# Patient Record
Sex: MALE | Race: WHITE | NOT HISPANIC OR LATINO | Employment: FULL TIME | URBAN - METROPOLITAN AREA
[De-identification: names, ages, dates, MRNs, and addresses within clinical notes are randomized per-mention and may not be internally consistent; named-entity substitution may affect disease eponyms.]

---

## 2024-01-29 ENCOUNTER — TELEPHONE (OUTPATIENT)
Dept: PSYCHIATRY | Facility: CLINIC | Age: 34
End: 2024-01-29

## 2024-01-29 NOTE — TELEPHONE ENCOUNTER
"Pt is a(n) adult (18+ out of HS) Seeking as eval for Adult Mental Health DA for Programmatic Care - Program Preference? Yes: IOP MH Day [place] .    Appointment scheduled by:  Other  (no cost estimate) Recovery   Patient name:  Harrison Peres    Caller phone #: [phone]    Legal Guardianship Reviewed?  No  Honoring Choices Notified?  No    Brief reason for appt:  Pt is currently in external recovery program, pt primarily struggling with mental health at this time,  wanting to see if this would be a good option for patient or not.      needed?  NO    Contact information verified/updated: Yes    Leonor Koenig    \"We have scheduled your evaluation. In the event that your insurance coverage comes back as out of network, you may receive a call to cancel your appointment and direct you to your insurance company for in-network coverage.\"    Disclaimer regarding insurance read to patient?  Yes     "

## 2024-02-13 NOTE — TELEPHONE ENCOUNTER
Pt, Harrison, insurance terminated 01/01/2024. Called pt to request new ins info. Call went to voicemail and voicemail box is full, unable to leave message for pt to request new ins info.

## 2024-04-11 ENCOUNTER — OFFICE VISIT (OUTPATIENT)
Dept: FAMILY MEDICINE | Facility: CLINIC | Age: 34
End: 2024-04-11
Payer: MEDICAID

## 2024-04-11 VITALS
WEIGHT: 315 LBS | DIASTOLIC BLOOD PRESSURE: 68 MMHG | OXYGEN SATURATION: 96 % | SYSTOLIC BLOOD PRESSURE: 109 MMHG | HEIGHT: 78 IN | BODY MASS INDEX: 36.45 KG/M2 | HEART RATE: 76 BPM | RESPIRATION RATE: 18 BRPM

## 2024-04-11 DIAGNOSIS — Z13.29 SCREENING FOR THYROID DISORDER: ICD-10-CM

## 2024-04-11 DIAGNOSIS — F12.10 CANNABIS ABUSE: ICD-10-CM

## 2024-04-11 DIAGNOSIS — Z13.0 SCREENING FOR BLOOD DISEASE: ICD-10-CM

## 2024-04-11 DIAGNOSIS — F23 BRIEF PSYCHOTIC DISORDER (H): ICD-10-CM

## 2024-04-11 DIAGNOSIS — Z13.1 SCREENING FOR DIABETES MELLITUS: ICD-10-CM

## 2024-04-11 DIAGNOSIS — Z13.220 SCREENING, LIPID: ICD-10-CM

## 2024-04-11 DIAGNOSIS — F43.0 ACUTE STRESS DISORDER: Primary | ICD-10-CM

## 2024-04-11 LAB
ERYTHROCYTE [DISTWIDTH] IN BLOOD BY AUTOMATED COUNT: 11.9 % (ref 10–15)
HBA1C MFR BLD: 5.2 % (ref 0–5.6)
HCT VFR BLD AUTO: 45.3 % (ref 40–53)
HGB BLD-MCNC: 14.7 G/DL (ref 13.3–17.7)
MCH RBC QN AUTO: 29.1 PG (ref 26.5–33)
MCHC RBC AUTO-ENTMCNC: 32.5 G/DL (ref 31.5–36.5)
MCV RBC AUTO: 90 FL (ref 78–100)
PLATELET # BLD AUTO: 282 10E3/UL (ref 150–450)
RBC # BLD AUTO: 5.06 10E6/UL (ref 4.4–5.9)
WBC # BLD AUTO: 9.4 10E3/UL (ref 4–11)

## 2024-04-11 PROCEDURE — 80053 COMPREHEN METABOLIC PANEL: CPT | Performed by: PHYSICIAN ASSISTANT

## 2024-04-11 PROCEDURE — 83036 HEMOGLOBIN GLYCOSYLATED A1C: CPT | Performed by: PHYSICIAN ASSISTANT

## 2024-04-11 PROCEDURE — 84443 ASSAY THYROID STIM HORMONE: CPT | Performed by: PHYSICIAN ASSISTANT

## 2024-04-11 PROCEDURE — 99203 OFFICE O/P NEW LOW 30 MIN: CPT | Performed by: PHYSICIAN ASSISTANT

## 2024-04-11 PROCEDURE — 80061 LIPID PANEL: CPT | Performed by: PHYSICIAN ASSISTANT

## 2024-04-11 PROCEDURE — 36415 COLL VENOUS BLD VENIPUNCTURE: CPT | Performed by: PHYSICIAN ASSISTANT

## 2024-04-11 PROCEDURE — 85027 COMPLETE CBC AUTOMATED: CPT | Performed by: PHYSICIAN ASSISTANT

## 2024-04-11 RX ORDER — CLONIDINE HYDROCHLORIDE 0.2 MG/1
1 TABLET ORAL
COMMUNITY
Start: 2024-03-22 | End: 2024-09-23

## 2024-04-11 RX ORDER — OLANZAPINE 10 MG/1
10 TABLET ORAL EVERY MORNING
COMMUNITY
Start: 2024-03-22 | End: 2024-07-22

## 2024-04-11 RX ORDER — QUETIAPINE 300 MG/1
300 TABLET, FILM COATED, EXTENDED RELEASE ORAL EVERY EVENING
COMMUNITY
Start: 2024-03-22 | End: 2024-07-22

## 2024-04-11 NOTE — LETTER
April 24, 2024      Harrison JUAREZ [place] Darling  [address] [facility] [address]        Dear Mr.Della Hastings,    We are writing to inform you of your test results.    It was a pleasure to see you at your recent visit.              The results of your recent total cholesterol test was within normal limits.      You have normal red blood cell (hemoglobin) levels, no anemia, normal white blood cell count and normal platelet levels.     Your TSH is slightly elevated but the actual T4 thyroid hormone is within normal limits.  Most of the time this doesn't require treatment or adjustments in medication.  Please follow-up if you are experiencing symptoms such as fatigue, low moods, constipation, swelling in extremities and cold intolerance.     Your complete metabolic panel indicates normal kidney function, normal electrolyte levels (sodium, potassium, and calcium levels are normal), and normal liver function (ALT, AST, bilirubin).       Your A1c is within normal limits        The rest of your labs are within acceptable limits :     Resulted Orders   Lipid Profile   Result Value Ref Range    Cholesterol 184 <200 mg/dL    Triglycerides 171 (H) <150 mg/dL    Direct Measure HDL 32 (L) >=40 mg/dL    LDL Cholesterol Calculated 118 (H) <=100 mg/dL    Non HDL Cholesterol 152 (H) <130 mg/dL    Patient Fasting > 8hrs? Unknown     Narrative    Cholesterol  Desirable:  <200 mg/dL    Triglycerides  Normal:  Less than 150 mg/dL  Borderline High:  150-199 mg/dL  High:  200-499 mg/dL  Very High:  Greater than or equal to 500 mg/dL    Direct Measure HDL  Female:  Greater than or equal to 50 mg/dL   Male:  Greater than or equal to 40 mg/dL    LDL Cholesterol  Desirable:  <100mg/dL  Above Desirable:  100-129 mg/dL   Borderline High:  130-159 mg/dL   High:  160-189 mg/dL   Very High:  >= 190 mg/dL    Non HDL Cholesterol  Desirable:  130 mg/dL  Above Desirable:  130-159 mg/dL  Borderline High:  160-189 mg/dL  High:  190-219  mg/dL  Very High:  Greater than or equal to 220 mg/dL   Comprehensive metabolic panel   Result Value Ref Range    Sodium 140 135 - 145 mmol/L      Comment:      Reference intervals for this test were updated on 09/26/2023 to more accurately reflect our healthy population. There may be differences in the flagging of prior results with similar values performed with this method. Interpretation of those prior results can be made in the context of the updated reference intervals.     Potassium 4.5 3.4 - 5.3 mmol/L    Carbon Dioxide (CO2) 25 22 - 29 mmol/L    Anion Gap 12 7 - 15 mmol/L    Urea Nitrogen 13.0 6.0 - 20.0 mg/dL    Creatinine 1.00 0.67 - 1.17 mg/dL    GFR Estimate >90 >60 mL/min/1.73m2    Calcium 9.5 8.6 - 10.0 mg/dL    Chloride 103 98 - 107 mmol/L    Glucose 90 70 - 99 mg/dL    Alkaline Phosphatase 85 40 - 150 U/L      Comment:      Reference intervals for this test were updated on 11/14/2023 to more accurately reflect our healthy population. There may be differences in the flagging of prior results with similar values performed with this method. Interpretation of those prior results can be made in the context of the updated reference intervals.    AST 23 0 - 45 U/L      Comment:      Reference intervals for this test were updated on 6/12/2023 to more accurately reflect our healthy population. There may be differences in the flagging of prior results with similar values performed with this method. Interpretation of those prior results can be made in the context of the updated reference intervals.    ALT 30 0 - 70 U/L      Comment:      Reference intervals for this test were updated on 6/12/2023 to more accurately reflect our healthy population. There may be differences in the flagging of prior results with similar values performed with this method. Interpretation of those prior results can be made in the context of the updated reference intervals.      Protein Total 6.7 6.4 - 8.3 g/dL    Albumin 4.2 3.5 - 5.2  g/dL    Bilirubin Total 0.3 <=1.2 mg/dL   TSH with free T4 reflex   Result Value Ref Range    TSH 1.56 0.30 - 4.20 uIU/mL   CBC with platelets   Result Value Ref Range    WBC Count 9.4 4.0 - 11.0 10e3/uL    RBC Count 5.06 4.40 - 5.90 10e6/uL    Hemoglobin 14.7 13.3 - 17.7 g/dL    Hematocrit 45.3 40.0 - 53.0 %    MCV 90 78 - 100 fL    MCH 29.1 26.5 - 33.0 pg    MCHC 32.5 31.5 - 36.5 g/dL    RDW 11.9 10.0 - 15.0 %    Platelet Count 282 150 - 450 10e3/uL   Hemoglobin A1c   Result Value Ref Range    Hemoglobin A1C 5.2 0.0 - 5.6 %      Comment:      Normal <5.7%   Prediabetes 5.7-6.4%    Diabetes 6.5% or higher     Note: Adopted from ADA consensus guidelines.       If you have any questions or concerns, please call the clinic at the number listed above.       Sincerely,      Ramona Ann Aaseby-Aguilera, PA-C

## 2024-04-11 NOTE — PROGRESS NOTES
"  Assessment & Plan     Acute stress disorder  Pt request for referral to mental   - Adult Mental Health  Referral; Future    Cannabis abuse    - Adult Mental Health  Referral; Future    Brief psychotic disorder (H)    - Adult Mental Health  Referral; Future    Screening for diabetes mellitus    - Comprehensive metabolic panel  - Hemoglobin A1c    Screening, lipid    - Lipid Profile    Screening for thyroid disorder    - TSH with free T4 reflex    Screening for blood disease    - CBC with platelets          BMI  Estimated body mass index is 38.43 kg/m  as calculated from the following:    Height as of this encounter: 2.07 m (6' 9.5\").    Weight as of this encounter: 164.7 kg (363 lb 1.6 oz).   Weight management plan: Discussed healthy diet and exercise guidelines          Gabrielle Terry is a 34 year old, presenting for the following health issues:  Establish Care and Referral (Psychiatric )        4/11/2024     2:09 PM   Additional Questions   Roomed by MARCO ANTONIO Fatima   Accompanied by Self       Is living in a rehab right now. Sees DR. Beny Caro at [facility] and was cleared of a diagnosis he received 4 years ago of delusional disorder.     Today wishing to establish care with a pcp.     Needs a refer Mental health.   History of Present Illness       Reason for visit:  General visit    He eats 2-3 servings of fruits and vegetables daily.He consumes 1 sweetened beverage(s) daily.He exercises with enough effort to increase his heart rate 30 to 60 minutes per day.  He exercises with enough effort to increase his heart rate 5 days per week.   He is taking medications regularly.                 Review of Systems  CONSTITUTIONAL: NEGATIVE for fever, chills, change in weight  INTEGUMENTARY/SKIN: NEGATIVE for worrisome rashes, moles or lesions  EYES: NEGATIVE for vision changes or irritation  ENT/MOUTH: NEGATIVE for ear, mouth and throat problems  RESP: NEGATIVE for significant " "cough or SOB  BREAST: NEGATIVE for masses, tenderness or discharge  CV: NEGATIVE for chest pain, palpitations or peripheral edema  GI: NEGATIVE for nausea, abdominal pain, heartburn, or change in bowel habits  : NEGATIVE for frequency, dysuria, or hematuria  MUSCULOSKELETAL: NEGATIVE for significant arthralgias or myalgia  NEURO: NEGATIVE for weakness, dizziness or paresthesias  ENDOCRINE: NEGATIVE for temperature intolerance, skin/hair changes  HEME: NEGATIVE for bleeding problems  PSYCHIATRIC: NEGATIVE for changes in mood or affect      Objective    /68 (BP Location: Right arm, Patient Position: Sitting, Cuff Size: Adult Large)   Pulse 76   Resp 18   Ht 2.07 m (6' 9.5\")   Wt (!) 164.7 kg (363 lb 1.6 oz)   SpO2 96%   BMI 38.43 kg/m    Body mass index is 38.43 kg/m .  Physical Exam   GENERAL: alert and no distress  EYES: Eyes grossly normal to inspection, PERRL and conjunctivae and sclerae normal  HENT: ear canals and TM's normal, nose and mouth without ulcers or lesions  NECK: no adenopathy, no asymmetry, masses, or scars  RESP: lungs clear to auscultation - no rales, rhonchi or wheezes  CV: regular rate and rhythm, normal S1 S2, no S3 or S4, no murmur, click or rub, no peripheral edema   PSYCH: mentation appears normal, affect normal/bright            Signed Electronically by: Ramona Ann Aaseby-Aguilera, PA-C    Answers submitted by the patient for this visit:  General Questionnaire (Submitted on 4/11/2024)  Chief Complaint: Chronic problems general questions HPI Form  What is the reason for your visit today? : general visit  How many servings of fruits and vegetables do you eat daily?: 2-3  On average, how many sweetened beverages do you drink each day (Examples: soda, juice, sweet tea, etc.  Do NOT count diet or artificially sweetened beverages)?: 1  How many minutes a day do you exercise enough to make your heart beat faster?: 30 to 60  How many days a week do you exercise enough to make your " heart beat faster?: 5  How many days per week do you miss taking your medication?: 0

## 2024-04-12 LAB
ALBUMIN SERPL BCG-MCNC: 4.2 G/DL (ref 3.5–5.2)
ALP SERPL-CCNC: 85 U/L (ref 40–150)
ALT SERPL W P-5'-P-CCNC: 30 U/L (ref 0–70)
ANION GAP SERPL CALCULATED.3IONS-SCNC: 12 MMOL/L (ref 7–15)
AST SERPL W P-5'-P-CCNC: 23 U/L (ref 0–45)
BILIRUB SERPL-MCNC: 0.3 MG/DL
BUN SERPL-MCNC: 13 MG/DL (ref 6–20)
CALCIUM SERPL-MCNC: 9.5 MG/DL (ref 8.6–10)
CHLORIDE SERPL-SCNC: 103 MMOL/L (ref 98–107)
CHOLEST SERPL-MCNC: 184 MG/DL
CREAT SERPL-MCNC: 1 MG/DL (ref 0.67–1.17)
DEPRECATED HCO3 PLAS-SCNC: 25 MMOL/L (ref 22–29)
EGFRCR SERPLBLD CKD-EPI 2021: >90 ML/MIN/1.73M2
FASTING STATUS PATIENT QL REPORTED: ABNORMAL
GLUCOSE SERPL-MCNC: 90 MG/DL (ref 70–99)
HDLC SERPL-MCNC: 32 MG/DL
LDLC SERPL CALC-MCNC: 118 MG/DL
NONHDLC SERPL-MCNC: 152 MG/DL
POTASSIUM SERPL-SCNC: 4.5 MMOL/L (ref 3.4–5.3)
PROT SERPL-MCNC: 6.7 G/DL (ref 6.4–8.3)
SODIUM SERPL-SCNC: 140 MMOL/L (ref 135–145)
TRIGL SERPL-MCNC: 171 MG/DL
TSH SERPL DL<=0.005 MIU/L-ACNC: 1.56 UIU/ML (ref 0.3–4.2)

## 2024-04-24 NOTE — RESULT ENCOUNTER NOTE
Dear Harrison,    It was a pleasure to see you at your recent visit.              The results of your recent total cholesterol test was within normal limits.      You have normal red blood cell (hemoglobin) levels, no anemia, normal white blood cell count and normal platelet levels.     Your TSH is slightly elevated but the actual T4 thyroid hormone is within normal limits.  Most of the time this doesn't require treatment or adjustments in medication.  Please follow-up if you are experiencing symptoms such as fatigue, low moods, constipation, swelling in extremities and cold intolerance.     Your complete metabolic panel indicates normal kidney function, normal electrolyte levels (sodium, potassium, and calcium levels are normal), and normal liver function (ALT, AST, bilirubin).       Your A1c is within normal limits      The rest of your labs are within acceptable limits :     Results for orders placed or performed in visit on 04/11/24  -Lipid Profile:      Status: Abnormal       Result                                            Value                         Ref Range                       Cholesterol                                       184                           <200 mg/dL                      Triglycerides                                     171 (H)                       <150 mg/dL                      Direct Measure HDL                                32 (L)                        >=40 mg/dL                      LDL Cholesterol Calculated                        118 (H)                       <=100 mg/dL                     Non HDL Cholesterol                               152 (H)                       <130 mg/dL                      Patient Fasting > 8hrs?                           Unknown                                                      Narrative    Cholesterol    Desirable:  <200 mg/dL        Triglycerides    Normal:  Less than 150 mg/dL    Borderline High:  150-199 mg/dL    High:  200-499 mg/dL    Very  High:  Greater than or equal to 500 mg/dL        Direct Measure HDL    Female:  Greater than or equal to 50 mg/dL     Male:  Greater than or equal to 40 mg/dL        LDL Cholesterol    Desirable:  <100mg/dL    Above Desirable:  100-129 mg/dL     Borderline High:  130-159 mg/dL     High:  160-189 mg/dL     Very High:  >= 190 mg/dL        Non HDL Cholesterol    Desirable:  130 mg/dL    Above Desirable:  130-159 mg/dL    Borderline High:  160-189 mg/dL    High:  190-219 mg/dL    Very High:  Greater than or equal to 220 mg/dL  -Comprehensive metabolic panel:      Status: Normal       Result                                            Value                         Ref Range                       Sodium                                            140                           135 - 145 mmol/L                Potassium                                         4.5                           3.4 - 5.3 mmol/L                Carbon Dioxide (CO2)                              25                            22 - 29 mmol/L                  Anion Gap                                         12                            7 - 15 mmol/L                   Urea Nitrogen                                     13.0                          6.0 - 20.0 mg/dL                Creatinine                                        1.00                          0.67 - 1.17 mg/dL               GFR Estimate                                      >90                           >60 mL/min/1.73m2               Calcium                                           9.5                           8.6 - 10.0 mg/dL                Chloride                                          103                           98 - 107 mmol/L                 Glucose                                           90                            70 - 99 mg/dL                   Alkaline Phosphatase                              85                            40 - 150 U/L                    AST                                                23                            0 - 45 U/L                      ALT                                               30                            0 - 70 U/L                      Protein Total                                     6.7                           6.4 - 8.3 g/dL                  Albumin                                           4.2                           3.5 - 5.2 g/dL                  Bilirubin Total                                   0.3                           <=1.2 mg/dL                -TSH with free T4 reflex:      Status: Normal       Result                                            Value                         Ref Range                       TSH                                               1.56                          0.30 - 4.20 uIU/mL         -CBC with platelets:      Status: Normal       Result                                            Value                         Ref Range                       WBC Count                                         9.4                           4.0 - 11.0 10e3/uL              RBC Count                                         5.06                          4.40 - 5.90 10e6/uL             Hemoglobin                                        14.7                          13.3 - 17.7 g/dL                Hematocrit                                        45.3                          40.0 - 53.0 %                   MCV                                               90                            78 - 100 fL                     MCH                                               29.1                          26.5 - 33.0 pg                  MCHC                                              32.5                          31.5 - 36.5 g/dL                RDW                                               11.9                          10.0 - 15.0 %                   Platelet Count                                    282                           150 -  "450 10e3/uL          -Hemoglobin A1c:      Status: Normal       Result                                            Value                         Ref Range                       Hemoglobin A1C                                    5.2                           0.0 - 5.6 %                    Thank you for choosing [facility]. We appreciate the opportunity to serve   you and look forward to supporting your healthcare needs in the future.    If you have any questions or concerns, please contact us at [phone]      Sincerely,        Ramona Aaseby-Aguilera PA-C          TEST DESCRIPTIONS   CBC (Complete Blood Coun  t) includes hemoglobin, hematocrit, white blood cells, etc. This test can be used to detect anemia, infection, and abnormalities in blood cells.   Cholesterol is one of the blood fats (lipids) and is the building block used by the body for cell wall and   hormone production. Increased levels of cholesterol have been proven to directly contribute to heart disease and strokes.   Triglycerides are one of the blood fats (lipids) and are thought to be associated with heart disease. If you have had anything to   eat or drink other than water for 12 hours before the test and your level is high, you should have the test repeated after a 12 hour fast. Abnormally high results may also be associated with diabetes, kidney and liver diseases.   LDL is the low density l  ipoprotein component of the cholesterol. It is the harmful substance that deposits cholesterol on the artery walls contributing to heart attacks and strokes. A high LDL level is associated with higher risk of coronary heart disease.   HDL stands for high   density lipoprotein and refers to the so-called \"good\" cholesterol. HDL picks up excess cholesterol in the bloodstream and carries it back to the liver for disposal. Individuals with higher than average HDL seem to have a lower risk of coronary disease.   Vigorous exercise will help " increase the blood levels of HDL.   Risk Factor (Total cholesterol/HDL) is a commonly used ratio for cardiac risk assessment. Less than 5.0 for men and 4.4 for women is ideal.   Sodium is an electrolyte useful in diagnosis of   dehydration, diabetes, hypertension, or other diseases involving electrolyte imbalance. It also preserves the balance between calcium and potassium to maintain normal heart action and equilibrium of the body.   Potassium is also an electrolyte that work  s with sodium to regulate the body's water balance and normalize heart rhythm.   Glucose is a measure of blood sugar and is one of the tests for diabetes. If you have not been fasting, your level will often be high. A low glucose level may be a cause of   weakness or dizziness. Blood sugar ranks with cholesterol as a causative factor in arteriosclerosis and heart attacks.   BUN & Creatinine are waste products excreted by the kidneys. A high BUN can be related to a high protein diet, heavy exercise, fever   and infections, dehydration, kidney stones, and kidney disease. Creatinine elevation is less dependent on diet or exercise and better represents kidney impairment. Low values are not generally significant.   Calcium is a mineral in the blood controlled b  y the parathyroid glands and kidneys. It is important in the formation of bone, in muscle and nerve function, and in blood clotting. Disease of the parathyroid gland, diseased bones or kidneys, or defective absorption of calcium may cause abnormal levels   from the intestine.   ALT, AST, Alk Phos are liver tests. Enzymes found in the liver as well as skeletal and cardiac muscle. Elevations can often be seen in alcoholism, liver or heart disease. Slightly abnormal values are not considered significant.   T  SH stands for Thyroid Stimulating Hormone. A sensitive test used to determine how the thyroid gland is functioning. The thyroid gland produces hormones that control the body's  metabolism. When too few hormones are produced (increased TSH), hypothyroidism   occurs which can cause fatigue, sensitivity to cold, and weight gain - an overall slowing down of bodily functions. When too many thyroid hormones are produces (or decreased TSH), it creates a condition call hyperthyroidism (such as Graves' disease) whi  ch causes rapid heartbeat, weight loss, and dizziness, among other symptoms.   PSA stands for Prostate Specific Antigen. Elevated levels of PSA can increase with trauma, infection, inflammation, or disease processes in the prostate such as BPH (Benigh Pr  ostatic Hypertrophy) or cancer.   Glycohemoglobin or HgBA1C is a 3-month average of blood sugar.

## 2024-07-22 ENCOUNTER — OFFICE VISIT (OUTPATIENT)
Dept: FAMILY MEDICINE | Facility: CLINIC | Age: 34
End: 2024-07-22
Payer: COMMERCIAL

## 2024-07-22 VITALS
OXYGEN SATURATION: 98 % | DIASTOLIC BLOOD PRESSURE: 73 MMHG | RESPIRATION RATE: 18 BRPM | HEIGHT: 78 IN | HEART RATE: 86 BPM | BODY MASS INDEX: 36.45 KG/M2 | TEMPERATURE: 98.1 F | WEIGHT: 315 LBS | SYSTOLIC BLOOD PRESSURE: 119 MMHG

## 2024-07-22 DIAGNOSIS — F43.21 COMPLICATED GRIEF: Primary | ICD-10-CM

## 2024-07-22 LAB
ALBUMIN SERPL BCG-MCNC: 4.4 G/DL (ref 3.5–5.2)
ALP SERPL-CCNC: 81 U/L (ref 40–150)
ALT SERPL W P-5'-P-CCNC: 33 U/L (ref 0–70)
ANION GAP SERPL CALCULATED.3IONS-SCNC: 13 MMOL/L (ref 7–15)
AST SERPL W P-5'-P-CCNC: 35 U/L (ref 0–45)
BILIRUB SERPL-MCNC: 0.6 MG/DL
BUN SERPL-MCNC: 15.7 MG/DL (ref 6–20)
CALCIUM SERPL-MCNC: 9.7 MG/DL (ref 8.8–10.4)
CHLORIDE SERPL-SCNC: 103 MMOL/L (ref 98–107)
CREAT SERPL-MCNC: 1.04 MG/DL (ref 0.67–1.17)
EGFRCR SERPLBLD CKD-EPI 2021: >90 ML/MIN/1.73M2
ERYTHROCYTE [DISTWIDTH] IN BLOOD BY AUTOMATED COUNT: 12.5 % (ref 10–15)
GLUCOSE SERPL-MCNC: 88 MG/DL (ref 70–99)
HBA1C MFR BLD: 5.2 % (ref 0–5.6)
HCO3 SERPL-SCNC: 24 MMOL/L (ref 22–29)
HCT VFR BLD AUTO: 43.3 % (ref 40–53)
HGB BLD-MCNC: 15 G/DL (ref 13.3–17.7)
MCH RBC QN AUTO: 30.7 PG (ref 26.5–33)
MCHC RBC AUTO-ENTMCNC: 34.6 G/DL (ref 31.5–36.5)
MCV RBC AUTO: 89 FL (ref 78–100)
PLATELET # BLD AUTO: 268 10E3/UL (ref 150–450)
POTASSIUM SERPL-SCNC: 4.7 MMOL/L (ref 3.4–5.3)
PROLACTIN SERPL 3RD IS-MCNC: 10 NG/ML (ref 4–15)
PROT SERPL-MCNC: 7.1 G/DL (ref 6.4–8.3)
RBC # BLD AUTO: 4.89 10E6/UL (ref 4.4–5.9)
SODIUM SERPL-SCNC: 140 MMOL/L (ref 135–145)
TSH SERPL DL<=0.005 MIU/L-ACNC: 1.46 UIU/ML (ref 0.3–4.2)
WBC # BLD AUTO: 7.3 10E3/UL (ref 4–11)

## 2024-07-22 PROCEDURE — G2211 COMPLEX E/M VISIT ADD ON: HCPCS | Performed by: PHYSICIAN ASSISTANT

## 2024-07-22 PROCEDURE — 80053 COMPREHEN METABOLIC PANEL: CPT | Performed by: PHYSICIAN ASSISTANT

## 2024-07-22 PROCEDURE — 85027 COMPLETE CBC AUTOMATED: CPT | Performed by: PHYSICIAN ASSISTANT

## 2024-07-22 PROCEDURE — 83036 HEMOGLOBIN GLYCOSYLATED A1C: CPT | Performed by: PHYSICIAN ASSISTANT

## 2024-07-22 PROCEDURE — 84146 ASSAY OF PROLACTIN: CPT | Performed by: PHYSICIAN ASSISTANT

## 2024-07-22 PROCEDURE — 84443 ASSAY THYROID STIM HORMONE: CPT | Performed by: PHYSICIAN ASSISTANT

## 2024-07-22 PROCEDURE — 99213 OFFICE O/P EST LOW 20 MIN: CPT | Performed by: PHYSICIAN ASSISTANT

## 2024-07-22 PROCEDURE — 36415 COLL VENOUS BLD VENIPUNCTURE: CPT | Performed by: PHYSICIAN ASSISTANT

## 2024-07-22 RX ORDER — OLANZAPINE 5 MG/1
TABLET ORAL
Qty: 42 TABLET | Refills: 0 | Status: SHIPPED | OUTPATIENT
Start: 2024-07-22 | End: 2024-09-23

## 2024-07-22 RX ORDER — QUETIAPINE 300 MG/1
300 TABLET, FILM COATED, EXTENDED RELEASE ORAL EVERY EVENING
Qty: 90 TABLET | Refills: 0 | Status: SHIPPED | OUTPATIENT
Start: 2024-07-22 | End: 2024-09-23

## 2024-07-22 ASSESSMENT — ANXIETY QUESTIONNAIRES
3. WORRYING TOO MUCH ABOUT DIFFERENT THINGS: NOT AT ALL
2. NOT BEING ABLE TO STOP OR CONTROL WORRYING: NOT AT ALL
IF YOU CHECKED OFF ANY PROBLEMS ON THIS QUESTIONNAIRE, HOW DIFFICULT HAVE THESE PROBLEMS MADE IT FOR YOU TO DO YOUR WORK, TAKE CARE OF THINGS AT HOME, OR GET ALONG WITH OTHER PEOPLE: NOT DIFFICULT AT ALL
7. FEELING AFRAID AS IF SOMETHING AWFUL MIGHT HAPPEN: NOT AT ALL
6. BECOMING EASILY ANNOYED OR IRRITABLE: NOT AT ALL
5. BEING SO RESTLESS THAT IT IS HARD TO SIT STILL: NOT AT ALL
GAD7 TOTAL SCORE: 0
GAD7 TOTAL SCORE: 0
1. FEELING NERVOUS, ANXIOUS, OR ON EDGE: NOT AT ALL

## 2024-07-22 ASSESSMENT — PAIN SCALES - GENERAL: PAINLEVEL: NO PAIN (0)

## 2024-07-22 ASSESSMENT — PATIENT HEALTH QUESTIONNAIRE - PHQ9
5. POOR APPETITE OR OVEREATING: NOT AT ALL
SUM OF ALL RESPONSES TO PHQ QUESTIONS 1-9: 4

## 2024-07-22 NOTE — PROGRESS NOTES
Assessment & Plan       ICD-10-CM    1. Complicated grief  F43.21 OLANZapine (ZYPREXA) 5 MG tablet     QUEtiapine ER (SEROQUEL XR) 300 MG 24 hr tablet     Comprehensive metabolic panel     CBC with platelets     Hemoglobin A1c     TSH with free T4 reflex     Comprehensive metabolic panel     CBC with platelets     Hemoglobin A1c     TSH with free T4 reflex        Patient stable on current regimen; discussed manifestations and course of complicated grief. Declines referral for talk therapy, has not been helpful in the past. Over-sedated on medications, most likely from morning dose of olanzapine. Would benefit from some cautious de-prescribing. Taper down and off olanzapine as prescribed. Continue quetiapine and clonidine at same doses at this time. Will check baseline labs for completeness. RTC for recheck in 2 months.    The longitudinal plan of care for the diagnosis(es)/condition(s) as documented were addressed during this visit. Due to the added complexity in care, I will continue to support Dimitri in the subsequent management and with ongoing continuity of care.      Gabrielle Terry is a 34 year old, presenting for the following health issues:  MH Follow Up (Medications are causing drowsiness )        7/22/2024     2:56 PM   Additional Questions   Roomed by Elvin BAINS   Accompanied by Mother (Cristian)     HPI .    Patient was in 90 day  mental health treatment program for complicated grief reaction. He witnessed his father die from ATV accident in 2020 and has been struggling since. He was discharged home on 6/5/2024. Unfortunately, OP follow-up was not coordinated and he has been without a provider since discharge. He reports over-sedation with current medications, sleeping too much. They remain helpful for his symptoms but are too sedating.      Review of Systems  Constitutional, HEENT, cardiovascular, pulmonary, GI, , musculoskeletal, neuro, skin, endocrine and psych systems are negative, except as  "otherwise noted.      Objective    /73   Pulse 86   Temp 98.1  F (36.7  C) (Temporal)   Resp 18   Ht 2.07 m (6' 9.5\")   Wt (!) 174.6 kg (385 lb)   SpO2 98%   BMI 40.75 kg/m    Body mass index is 40.75 kg/m .  Physical Exam   GENERAL:  WDWN, no acute distress  PSYCH: Mental Status Exam  Behavior: cooperative and pleasant  Speech: normal rate and rhythm  Mood: description consistent with euthymia  Affect: Subdued  Thought Processes: Logical and Linear  Thought Content: denies suicidal ideation, intent or thoughts  Insight: Fair  Judgment: Adequate for safety  EYES: no discharge, no injection  HENT:  Normocephalic. Moist mucus membranes.  NECK:  Supple, symmetric  LUNGS:  Clear to auscultation bilaterally without rhonchi, rales, or wheeze. Chest rise symmetric and no tenderness to palpation.  HEART:  Regular rate & rhythm. No murmur, gallop, or rub.  EXTREMITIES:  No gross deformities, moves all 4 limbs spontaneously  SKIN:  Warm and dry, no rash or suspicious lesions    NEUROLOGIC:  alert, sensation grossly intact.            Signed Electronically by: Doretha Quiñones PA-C    "

## 2024-07-22 NOTE — LETTER
July 24, 2024      Dimitri Peres  [address]  [facility] [address]        Dear Dimitri,    It was a pleasure meeting you this week. We are writing to inform you of your test results.    - Your metabolic panel shows:  normal electrolytes (sodium, potassium, calcium) normal kidney function (creatinine and GFR) normal liver function (AST/ALT)  - Hemoglobin A1c is a test shows your blood sugar level over the last 2-3 months. A normal result for someone who does not have diabetes is 4-5.6% (fasting blood sugar <100).  - Your TSH, a screening test for thyroid disease, was normal.  - Your Blood Count Results show normal White Blood Cell count (no sign of infection), normal Hemoglobin (not anemia), and normal Platelets (affects clotting).  - Your prolactin level is normal.    Results for orders placed or performed in visit on 07/22/24   Comprehensive metabolic panel     Status: Normal   Result Value Ref Range    Sodium 140 135 - 145 mmol/L    Potassium 4.7 3.4 - 5.3 mmol/L    Carbon Dioxide (CO2) 24 22 - 29 mmol/L    Anion Gap 13 7 - 15 mmol/L    Urea Nitrogen 15.7 6.0 - 20.0 mg/dL    Creatinine 1.04 0.67 - 1.17 mg/dL    GFR Estimate >90 >60 mL/min/1.73m2    Calcium 9.7 8.8 - 10.4 mg/dL    Chloride 103 98 - 107 mmol/L    Glucose 88 70 - 99 mg/dL    Alkaline Phosphatase 81 40 - 150 U/L    AST 35 0 - 45 U/L    ALT 33 0 - 70 U/L    Protein Total 7.1 6.4 - 8.3 g/dL    Albumin 4.4 3.5 - 5.2 g/dL    Bilirubin Total 0.6 <=1.2 mg/dL   CBC with platelets     Status: Normal   Result Value Ref Range    WBC Count 7.3 4.0 - 11.0 10e3/uL    RBC Count 4.89 4.40 - 5.90 10e6/uL    Hemoglobin 15.0 13.3 - 17.7 g/dL    Hematocrit 43.3 40.0 - 53.0 %    MCV 89 78 - 100 fL    MCH 30.7 26.5 - 33.0 pg    MCHC 34.6 31.5 - 36.5 g/dL    RDW 12.5 10.0 - 15.0 %    Platelet Count 268 150 - 450 10e3/uL   Hemoglobin A1c     Status: Normal   Result Value Ref Range    Hemoglobin A1C 5.2 0.0 - 5.6 %   TSH with free T4 reflex     Status: Normal   Result  Value Ref Range    TSH 1.46 0.30 - 4.20 uIU/mL   Prolactin     Status: Normal   Result Value Ref Range    Prolactin 10 4 - 15 ng/mL       Thank you for choosing [facility].  We appreciate the opportunity to serve you and look forward to supporting your healthcare needs in the future.    If you have any questions or concerns, please call me or my staff at [phone].      Sincerely,        Doretha Quiñones PA-C

## 2024-09-23 ENCOUNTER — OFFICE VISIT (OUTPATIENT)
Dept: FAMILY MEDICINE | Facility: CLINIC | Age: 34
End: 2024-09-23
Payer: COMMERCIAL

## 2024-09-23 VITALS
TEMPERATURE: 98 F | SYSTOLIC BLOOD PRESSURE: 118 MMHG | DIASTOLIC BLOOD PRESSURE: 84 MMHG | BODY MASS INDEX: 42.22 KG/M2 | OXYGEN SATURATION: 97 % | HEART RATE: 103 BPM | WEIGHT: 315 LBS | RESPIRATION RATE: 26 BRPM

## 2024-09-23 DIAGNOSIS — F43.21 COMPLICATED GRIEF: Primary | ICD-10-CM

## 2024-09-23 PROCEDURE — 99213 OFFICE O/P EST LOW 20 MIN: CPT | Performed by: PHYSICIAN ASSISTANT

## 2024-09-23 PROCEDURE — G2211 COMPLEX E/M VISIT ADD ON: HCPCS | Performed by: PHYSICIAN ASSISTANT

## 2024-09-23 RX ORDER — CLONIDINE HYDROCHLORIDE 0.2 MG/1
0.2 TABLET ORAL 2 TIMES DAILY
Qty: 180 TABLET | Refills: 1 | Status: SHIPPED | OUTPATIENT
Start: 2024-09-23

## 2024-09-23 RX ORDER — QUETIAPINE 300 MG/1
300 TABLET, FILM COATED, EXTENDED RELEASE ORAL EVERY EVENING
Qty: 90 TABLET | Refills: 1 | Status: SHIPPED | OUTPATIENT
Start: 2024-09-23

## 2024-09-23 ASSESSMENT — ANXIETY QUESTIONNAIRES
GAD7 TOTAL SCORE: 5
7. FEELING AFRAID AS IF SOMETHING AWFUL MIGHT HAPPEN: NOT AT ALL
8. IF YOU CHECKED OFF ANY PROBLEMS, HOW DIFFICULT HAVE THESE MADE IT FOR YOU TO DO YOUR WORK, TAKE CARE OF THINGS AT HOME, OR GET ALONG WITH OTHER PEOPLE?: NOT DIFFICULT AT ALL
GAD7 TOTAL SCORE: 5
GAD7 TOTAL SCORE: 5

## 2024-09-23 ASSESSMENT — PAIN SCALES - GENERAL: PAINLEVEL: NO PAIN (0)

## 2024-09-23 NOTE — PROGRESS NOTES
Assessment & Plan       ICD-10-CM    1. Complicated grief  F43.21 cloNIDine (CATAPRES) 0.2 MG tablet     QUEtiapine ER (SEROQUEL XR) 300 MG 24 hr tablet        Complicated grief well managed on current regimen, continue without change. Refills as above.    The longitudinal plan of care for the diagnosis(es)/condition(s) as documented were addressed during this visit. Due to the added complexity in care, I will continue to support Dimitri in the subsequent management and with ongoing continuity of care.    Subjective   Dimitri is a 34 year old, presenting for the following health issues:  Recheck Medication        9/23/2024     3:49 PM   Additional Questions   Roomed by Jolene PINON     History of Present Illness       Mental Health Follow-up:  Patient presents to follow-up on Depression & Anxiety.Patient's depression since last visit has been:  Better  The patient is not having other symptoms associated with depression.  Patient's anxiety since last visit has been:  Better  The patient is not having other symptoms associated with anxiety.  Any significant life events: job concerns  Patient is not feeling anxious or having panic attacks.  Patient has no concerns about alcohol or drug use.    He eats 0-1 servings of fruits and vegetables daily.He consumes 0 sweetened beverage(s) daily.He exercises with enough effort to increase his heart rate 60 or more minutes per day.  He exercises with enough effort to increase his heart rate 6 days per week.   He is taking medications regularly.         7/22/2024     3:23 PM 9/23/2024     9:07 AM   BERNARD-7 SCORE   Total Score  5 (mild anxiety)   Total Score 0 5     Last visit was 2 months ago, met patient after he completed a 90-day  mental health program for complicated grief. He is currently on probation and participating in a group program. Has tried individual talk therapy in the past and found it unhelpful. He gradually weaned off olanzapine without issue or change in mental health  symptoms. Does note daytime fatigue resolved upon weaning off olanzapine. Remains on Seroquel at bedtime and clonidine BID with good management; sleeping regularly now. Just started a new job today with [employer].      Review of Systems  Constitutional, HEENT, cardiovascular, pulmonary, GI, , musculoskeletal, neuro, skin, endocrine and psych systems are negative, except as otherwise noted.      Objective    /84   Pulse 103   Temp 98  F (36.7  C)   Resp 26   Wt (!) 180.9 kg (398 lb 14.4 oz)   SpO2 97%   BMI 42.22 kg/m    Body mass index is 42.22 kg/m .  Physical Exam   GENERAL:  WDWN, no acute distress  PSYCH: pleasant, cooperative  EYES: no discharge, no injection  HENT:  Normocephalic. Moist mucus membranes.  NECK:  Supple, symmetric  LUNGS:  Clear to auscultation bilaterally without rhonchi, rales, or wheeze. Chest rise symmetric and no tenderness to palpation.  HEART:  Regular rate & rhythm. No murmur, gallop, or rub.  EXTREMITIES:  No gross deformities, moves all 4 limbs spontaneously  SKIN:  Warm and dry, no rash or suspicious lesions    NEUROLOGIC:  alert, sensation grossly intact.            Signed Electronically by: Doretha Quiñones PA-C

## 2024-11-03 DIAGNOSIS — F43.21 COMPLICATED GRIEF: ICD-10-CM

## 2024-11-03 NOTE — TELEPHONE ENCOUNTER
Medication Question or Refill    Contacts       Contact Date/Time Type Contact Phone/Fax    11/03/2024 02:53 PM CST Phone (Incoming) Dimitri Peres (Self) [phone] (M)            What medication are you calling about (include dose and sig)?:     cloNIDine (CATAPRES) 0.2 MG tablet       Preferred Pharmacy:   [place] [address] [place] [address] [address]  Phone: [phone] Fax: [phone]      Controlled Substance Agreement on file:   CSA -- Patient Level:    CSA: None found at the patient level.       Who prescribed the medication?: Chromy    Do you need a refill? Yes    When did you use the medication last? 1 day ago    Patient offered an appointment? Yes: pt has upcoming appt    Do you have any questions or concerns?  Yes: pt is out of medications. Requesting for refill ASAP      Okay to leave a detailed message?: Yes at Cell number on file:    Telephone Information:   Mobile [phone]

## 2024-11-04 RX ORDER — CLONIDINE HYDROCHLORIDE 0.2 MG/1
0.2 TABLET ORAL 2 TIMES DAILY
Qty: 180 TABLET | Refills: 1 | OUTPATIENT
Start: 2024-11-04

## 2024-11-08 ENCOUNTER — TELEPHONE (OUTPATIENT)
Dept: FAMILY MEDICINE | Facility: CLINIC | Age: 34
End: 2024-11-08
Payer: COMMERCIAL

## 2024-11-08 NOTE — TELEPHONE ENCOUNTER
Mother calling, no consent to speak with mother. She understands. .     Mother was told to have her son call the clinic and speak with a nurse regarding his medication Olanzapine.     Enedelia Maldonado RN  [facility]

## 2024-11-08 NOTE — TELEPHONE ENCOUNTER
Patient calling stating he called pharmacy looking for refill of zyprexa. Per chart review he finished weaning schedule in September. Patient now recalls this plan and has no further questions or concerns at this time.     Chetna Ramirez RN